# Patient Record
Sex: FEMALE | Race: WHITE
[De-identification: names, ages, dates, MRNs, and addresses within clinical notes are randomized per-mention and may not be internally consistent; named-entity substitution may affect disease eponyms.]

---

## 2019-02-14 ENCOUNTER — HOSPITAL ENCOUNTER (EMERGENCY)
Dept: HOSPITAL 76 - ED | Age: 34
Discharge: HOME | End: 2019-02-14
Payer: COMMERCIAL

## 2019-02-14 VITALS — DIASTOLIC BLOOD PRESSURE: 88 MMHG | SYSTOLIC BLOOD PRESSURE: 112 MMHG

## 2019-02-14 DIAGNOSIS — K04.7: Primary | ICD-10-CM

## 2019-02-14 PROCEDURE — 99283 EMERGENCY DEPT VISIT LOW MDM: CPT

## 2019-02-14 NOTE — ED PHYSICIAN DOCUMENTATION
History of Present Illness





- Stated complaint


Stated Complaint: RIGHT SIDE OF FACE SWOLLEN





- Chief complaint


Chief Complaint: Heent





- Additonal information


Additional information: 


33-year-old female presents the emergency department with complaints of right 

lower jaw pain and mild face swelling for the past day.  The patient reports 

pain with chewing and temperature changes.  No fevers.  No difficulty 

swallowing.  No tongue swelling or voice changes.  Symptoms are described as 

mild.  No other associated symptoms.








Review of Systems


Constitutional: denies: Fever, Chills


Eyes: denies: Discharge


Ears: reports: Ear pain


Nose: reports: Congestion


Throat: reports: Dental pain / toothache


Respiratory: denies: Cough


Musculoskeletal: denies: Neck pain


Neurologic: denies: Generalized weakness





PD PAST MEDICAL HISTORY





- Past Medical History


Past Medical History: No





- Past Surgical History


Past Surgical History: No





- Present Medications


Home Medications: 


                                Ambulatory Orders











 Medication  Instructions  Recorded  Confirmed


 


Amoxicillin 875 mg PO BID #20 tablet 02/14/19 














- Allergies


Allergies/Adverse Reactions: 


                                    Allergies











Allergy/AdvReac Type Severity Reaction Status Date / Time


 


No Known Drug Allergies Allergy   Verified 02/14/19 10:08














- Social History


Does the pt smoke?: No


Smoking Status: Never smoker


Does the pt drink ETOH?: No


Does the pt have substance abuse?: No





- Immunizations


Immunizations are current?: Yes





- POLST


Patient has POLST: No





PD ED PE NORMAL





- General


General: Alert and oriented X 3, No acute distress





- HEENT


HEENT: Atraumatic, PERRL, EOMI, Ears normal





- Cardiac


Cardiac: RRR





- Respiratory


Respiratory: No respiratory distress





- Derm


Derm: Normal color





- Extremities


Extremities: No deformity





- Neuro


Neuro: Alert and oriented X 3, Normal speech





- Psych


Psych: Normal mood





PD ED PE EXPANDED





- HEENT


HEENT Visual: 


                            __________________________














                            __________________________





 1 - tenderness (The patient has tenderness to palpation, there is no dental 

decay but the gingiva is inflamed.  There is no drainable abscess in the mucosa.

 The tongue is within normal limits and the posterior pharynx is within normal 

limits.  The patient does have some mild tenderness and swelling adjacent in the

face.  No facial cellulitis or drainable facial abscess)








Results





- Vitals


Vitals: 


                               Vital Signs - 24 hr











  02/14/19





  10:01


 


Temperature 36.7 C


 


Heart Rate 84


 


Respiratory 14





Rate 


 


Blood Pressure 112/88 H


 


O2 Saturation 99








                                     Oxygen











O2 Source                      Room air

















PD MEDICAL DECISION MAKING





- ED course


ED course: 





The patient appears to have an early dental infection, the patient will be 

started on a course of oral antibiotics and I advised following up with the 

dentist for further evaluation and management.  I discussed warning signs and 

recommended returning for any worsening or any concerns.





Departure





- Departure


Disposition: 01 Home, Self Care


Clinical Impression: 


 Dental infection





Condition: Good


Instructions:  ED Abscess Dental


Prescriptions: 


Amoxicillin 875 mg PO BID #20 tablet


Comments: 


Please follow-up with your Dentist for further evaluation of your dental 

infection





Please return to the emergency department for worsening symptoms or any concerns

## 2020-02-09 ENCOUNTER — HOSPITAL ENCOUNTER (EMERGENCY)
Dept: HOSPITAL 76 - ED | Age: 35
Discharge: HOME | End: 2020-02-09
Payer: COMMERCIAL

## 2020-02-09 VITALS — SYSTOLIC BLOOD PRESSURE: 111 MMHG | DIASTOLIC BLOOD PRESSURE: 80 MMHG

## 2020-02-09 DIAGNOSIS — R42: ICD-10-CM

## 2020-02-09 DIAGNOSIS — R06.02: ICD-10-CM

## 2020-02-09 DIAGNOSIS — M54.6: Primary | ICD-10-CM

## 2020-02-09 LAB
ALBUMIN DIAFP-MCNC: 4.4 G/DL (ref 3.2–5.5)
ALBUMIN/GLOB SERPL: 1.3 {RATIO} (ref 1–2.2)
ALP SERPL-CCNC: 65 IU/L (ref 42–121)
ALT SERPL W P-5'-P-CCNC: 15 IU/L (ref 10–60)
ANION GAP SERPL CALCULATED.4IONS-SCNC: 9 MMOL/L (ref 6–13)
AST SERPL W P-5'-P-CCNC: 21 IU/L (ref 10–42)
BASOPHILS NFR BLD AUTO: 0.1 10^3/UL (ref 0–0.1)
BASOPHILS NFR BLD AUTO: 1.3 %
BILIRUB BLD-MCNC: 0.7 MG/DL (ref 0.2–1)
BUN SERPL-MCNC: 15 MG/DL (ref 6–20)
CALCIUM UR-MCNC: 9.2 MG/DL (ref 8.5–10.3)
CHLORIDE SERPL-SCNC: 104 MMOL/L (ref 101–111)
CLARITY UR REFRACT.AUTO: CLEAR
CO2 SERPL-SCNC: 26 MMOL/L (ref 21–32)
CREAT SERPLBLD-SCNC: 0.6 MG/DL (ref 0.4–1)
EOSINOPHIL # BLD AUTO: 0.4 10^3/UL (ref 0–0.7)
EOSINOPHIL NFR BLD AUTO: 10.3 %
ERYTHROCYTE [DISTWIDTH] IN BLOOD BY AUTOMATED COUNT: 12.8 % (ref 12–15)
GFRSERPLBLD MDRD-ARVRAT: 114 ML/MIN/{1.73_M2} (ref 89–?)
GLOBULIN SER-MCNC: 3.5 G/DL (ref 2.1–4.2)
GLUCOSE SERPL-MCNC: 104 MG/DL (ref 70–100)
GLUCOSE UR QL STRIP.AUTO: NEGATIVE MG/DL
HCG UR QL: NEGATIVE
HGB UR QL STRIP: 13.5 G/DL (ref 12–16)
KETONES UR QL STRIP.AUTO: NEGATIVE MG/DL
LIPASE SERPL-CCNC: 43 U/L (ref 22–51)
LYMPHOCYTES # SPEC AUTO: 1.4 10^3/UL (ref 1.5–3.5)
LYMPHOCYTES NFR BLD AUTO: 35 %
MCH RBC QN AUTO: 31.3 PG (ref 27–31)
MCHC RBC AUTO-ENTMCNC: 33.4 G/DL (ref 32–36)
MCV RBC AUTO: 93.5 FL (ref 81–99)
MONOCYTES # BLD AUTO: 0.4 10^3/UL (ref 0–1)
MONOCYTES NFR BLD AUTO: 9.8 %
NEUTROPHILS # BLD AUTO: 1.7 10^3/UL (ref 1.5–6.6)
NEUTROPHILS # SNV AUTO: 3.9 X10^3/UL (ref 4.8–10.8)
NEUTROPHILS NFR BLD AUTO: 43.6 %
NITRITE UR QL STRIP.AUTO: NEGATIVE
PDW BLD AUTO: 11.5 FL (ref 7.9–10.8)
PH UR STRIP.AUTO: 6.5 PH (ref 5–7.5)
PLATELET # BLD: 215 10^3/UL (ref 130–450)
PROT SPEC-MCNC: 7.9 G/DL (ref 6.7–8.2)
PROT UR STRIP.AUTO-MCNC: NEGATIVE MG/DL
RBC # UR STRIP.AUTO: NEGATIVE /UL
RBC MAR: 4.32 10^6/UL (ref 4.2–5.4)
SODIUM SERPLBLD-SCNC: 139 MMOL/L (ref 135–145)
SP GR UR STRIP.AUTO: <=1.005 (ref 1–1.03)
SQUAMOUS URNS QL MICRO: (no result)
UROBILINOGEN UR QL STRIP.AUTO: (no result) E.U./DL
UROBILINOGEN UR STRIP.AUTO-MCNC: NEGATIVE MG/DL

## 2020-02-09 PROCEDURE — 71046 X-RAY EXAM CHEST 2 VIEWS: CPT

## 2020-02-09 PROCEDURE — 99284 EMERGENCY DEPT VISIT MOD MDM: CPT

## 2020-02-09 PROCEDURE — 96360 HYDRATION IV INFUSION INIT: CPT

## 2020-02-09 PROCEDURE — 87086 URINE CULTURE/COLONY COUNT: CPT

## 2020-02-09 PROCEDURE — 81001 URINALYSIS AUTO W/SCOPE: CPT

## 2020-02-09 PROCEDURE — 81025 URINE PREGNANCY TEST: CPT

## 2020-02-09 PROCEDURE — 81003 URINALYSIS AUTO W/O SCOPE: CPT

## 2020-02-09 PROCEDURE — 83690 ASSAY OF LIPASE: CPT

## 2020-02-09 PROCEDURE — 80053 COMPREHEN METABOLIC PANEL: CPT

## 2020-02-09 PROCEDURE — 85025 COMPLETE CBC W/AUTO DIFF WBC: CPT

## 2020-02-09 PROCEDURE — 36415 COLL VENOUS BLD VENIPUNCTURE: CPT

## 2020-02-09 NOTE — ED PHYSICIAN DOCUMENTATION
History of Present Illness





- Stated complaint


Stated Complaint: DIZZINESS,BACK NUMBNESS





- Chief complaint


Chief Complaint: Resp





- History obtained from


History obtained from: Patient





- Additonal information


Additional information: 





This is a 34-year-old woman who presents with complaints that she has developed 

a metallic taste in her mouth, she is feeling short of breath sometimes and 

dizzy sometimes she is having pain across her epigastric area sometimes.  The 

symptoms of been going on for about a week and then 3 or 4 days ago she 

developed pain across her mid back just below the shoulder blades and she cannot

get comfortable because of it.  She has not taken anything for it.  She reports 

the pain is constant but it does hurt more if she takes a deep breath.  Has not 

noted any change with movement.  She feels very fatigued.  Denies pregnancy 

stating her last menstrual period just ended.  She denies any recent illness, 

However she was placed on rifampin in the middle of December because she went to

work as a caregiver and assistant and childcare.  She tested positive for 

tuberculosis.  She thinks this was done off of the blood test that was done on 

the [employer] base so we do not have access to those records.  She is from [place] 

originally and is not sure if she was vaccinated against TB or not.





Review of Systems


Constitutional: reports: Fatigue.  denies: Fever


Eyes: denies: Loss of vision, Decreased vision


Ears: reports: Other (She hears a crunching sound in her right ear 

occasionally).  denies: Tinnitus/ringing


Nose: denies: Rhinorrhea / runny nose, Congestion


Throat: reports: Sore throat (She is had a mild sore throat when she swallows)


Cardiac: denies: Palpitations, Pedal edema


Respiratory: reports: Dyspnea.  denies: Cough


GI: reports: Abdominal Pain.  denies: Vomiting, Diarrhea


: reports: Other (Her urine is discolored since she started taking the 

rifampin).  denies: Dysuria


Skin: denies: Rash


Musculoskeletal: reports: Back pain


Neurologic: denies: Generalized weakness, Focal weakness, Numbness, Near 

syncope, Syncope


Endocrine: denies: Polydypsia, Polyuria





PD PAST MEDICAL HISTORY





- Past Medical History


Respiratory: Other





- Past Surgical History


Past Surgical History: No


General: Cholecystectomy





- Present Medications


Home Medications: 


                                Ambulatory Orders











 Medication  Instructions  Recorded  Confirmed


 


rifAMPin [Rifampin] 600 mg PO DAILY 02/09/20 02/09/20














- Allergies


Allergies/Adverse Reactions: 


                                    Allergies











Allergy/AdvReac Type Severity Reaction Status Date / Time


 


No Known Drug Allergies Allergy   Verified 02/14/19 10:08














- Social History


Does the pt smoke?: No


Smoking Status: Never smoker


Does the pt drink ETOH?: No


Does the pt have substance abuse?: No





- Immunizations


Immunizations are current?: Yes





- POLST


Patient has POLST: No





PD ED PE NORMAL





- Vitals


Vital signs reviewed: Yes





- General


General: Alert and oriented X 3, No acute distress, Well developed/nourished, 

Other (Thin, pleasant formerly [facility] woman)





- HEENT


HEENT: Atraumatic, PERRL, Moist mucous membranes, Pharynx benign, Other 

(Conjunctiva is clear.  Tympanic membrane's are both clear with no erythema or 

dullness.  There is no obvious serous fluid.)





- Neck


Neck: Supple, no meningeal sign, Thyroid normal, Other (There is an enlarged 

right anterior cervical lymph node That is not tender.  There is an enlarged 

left posterior cervical lymph node that also is not tender.)





- Cardiac


Cardiac: RRR, No murmur, Strong equal pulses





- Respiratory


Respiratory: No respiratory distress, Clear bilaterally





- Abdomen


Abdomen: Normal bowel sounds, Soft, Non tender, No organomegaly





- Back


Back: No CVA TTP, No spinal TTP





- Derm


Derm: Normal color, Warm and dry, No rash





- Extremities


Extremities: Normal ROM s pain, No edema





- Neuro


Neuro: Alert and oriented X 3, CNs 2-12 intact, No motor deficit, No sensory 

deficit, Normal speech, Other (No ataxia with a normal gait.)





- Psych


Psych: Normal mood, Normal affect





Results





- Vitals


Vitals: 


                                     Oxygen











O2 Source                      Room air

















- Labs


Labs: 


                                  Microbiology











 02/09/20 10:25 Urine Culture - Final





 Urine,Clean Catch    NO AEROBIC GROWTH AT 24 HOURS








                                Laboratory Tests











  02/09/20 02/09/20 02/09/20





  10:22 10:22 10:25


 


WBC  3.9 L  


 


RBC  4.32  


 


Hgb  13.5  


 


Hct  40.4  


 


MCV  93.5  


 


MCH  31.3 H  


 


MCHC  33.4  


 


RDW  12.8  


 


Plt Count  215  


 


MPV  11.5 H  


 


Neut # (Auto)  1.7  


 


Lymph # (Auto)  1.4 L  


 


Mono # (Auto)  0.4  


 


Eos # (Auto)  0.4  


 


Baso # (Auto)  0.1  


 


Absolute Nucleated RBC  0.00  


 


Nucleated RBC %  0.0  


 


Sodium   139 


 


Potassium   3.6 


 


Chloride   104 


 


Carbon Dioxide   26 


 


Anion Gap   9.0 


 


BUN   15 


 


Creatinine   0.6 


 


Estimated GFR (MDRD)   114 


 


Glucose   104 H 


 


Calcium   9.2 


 


Total Bilirubin   0.7 


 


AST   21 


 


ALT   15 


 


Alkaline Phosphatase   65 


 


Total Protein   7.9 


 


Albumin   4.4 


 


Globulin   3.5 


 


Albumin/Globulin Ratio   1.3 


 


Lipase   43 


 


Urine Color    DARK YELLOW


 


Urine Clarity    CLEAR


 


Urine pH    6.5


 


Ur Specific Gravity    <=1.005


 


Urine Protein    NEGATIVE


 


Urine Glucose (UA)    NEGATIVE


 


Urine Ketones    NEGATIVE


 


Urine Occult Blood    NEGATIVE


 


Urine Nitrite    NEGATIVE


 


Urine Bilirubin    NEGATIVE


 


Urine Urobilinogen    0.2 (NORMAL)


 


Ur Leukocyte Esterase    TRACE H


 


Urine RBC    None Seen


 


Urine WBC    0-3


 


Ur Squamous Epith Cells    FEW Squamous


 


Urine Bacteria    Rare


 


Ur Microscopic Review    INDICATED


 


Urine Culture Comments    INDICATED


 


Urine HCG, Qual    NEGATIVE














- Rads (name of study)


  ** CXR


Radiology: EMP read contemporaneously (neg acute.), See rad report





PD MEDICAL DECISION MAKING





- ED course


Complexity details: reviewed results, re-evaluated patient, d/w patient


ED course: 





Unbeknownst to me the patient had declined the Toradol IV.  The laboratory 

studies were discussed with her everything looks essentially normal.  Chest x-

ray was clear.  She was concerned that the rifampin might be causing her 

symptoms but I do not see that listed as in the side effects.  At this time I am

 going recommend treatment with anti-inflammatories which she then stated her 

primary care provider had already suggested she take.  She is referred back to 

them for follow-up and further management.





Departure





- Departure


Disposition: 01 Home, Self Care


Clinical Impression: 


Back pain


Qualifiers:


 Back pain location: thoracic back pain Chronicity: unspecified Back pain 

laterality: midline Qualified Code(s): M54.6 - Pain in thoracic spine





Condition: Good


Instructions:  ED Neck Back Pain General


Follow-Up: 


GALILEO FONTANA [Primary Care Provider] - 


Comments: 


Continue the rifampin medication as prescribed.  Take ibuprofen 3 tablets every 

8 hours if needed over-the-counter for pain.  Follow-up with your primary care 

provider for further evaluation and management of the pain if not improving.


Discharge Date/Time: 02/09/20 13:00

## 2020-02-09 NOTE — XRAY REPORT
Reason:  cough

Procedure Date:  02/09/2020   

Accession Number:  [id number] / [id number]                    

Procedure:  XR  - Chest 2 View X-Ray CPT Code:  21306

 

***Final Report***

 

 

FULL RESULT:

 

 

EXAM:

CHEST RADIOGRAPHY

 

EXAM DATE: 2/9/2020 10:40 AM.

 

CLINICAL HISTORY: Cough.

 

COMPARISON: None.

 

TECHNIQUE: 2 views.

 

FINDINGS:

Lungs/Pleura: No focal opacities evident. No pleural effusion. No 

pneumothorax. Normal volumes.

 

Mediastinum: Heart and mediastinal contours are unremarkable.

 

Other: None.

IMPRESSION: Normal 2-view chest radiography.

 

[facility]